# Patient Record
Sex: FEMALE | Race: OTHER | HISPANIC OR LATINO | URBAN - NONMETROPOLITAN AREA
[De-identification: names, ages, dates, MRNs, and addresses within clinical notes are randomized per-mention and may not be internally consistent; named-entity substitution may affect disease eponyms.]

---

## 2024-04-17 ENCOUNTER — OFFICE (OUTPATIENT)
Dept: URBAN - NONMETROPOLITAN AREA CLINIC 1 | Facility: CLINIC | Age: 34
End: 2024-04-17
Payer: COMMERCIAL

## 2024-04-17 VITALS
WEIGHT: 122 LBS | HEART RATE: 89 BPM | DIASTOLIC BLOOD PRESSURE: 88 MMHG | HEIGHT: 61 IN | SYSTOLIC BLOOD PRESSURE: 125 MMHG

## 2024-04-17 DIAGNOSIS — R13.10 DYSPHAGIA, UNSPECIFIED: ICD-10-CM

## 2024-04-17 DIAGNOSIS — K21.9 GASTRO-ESOPHAGEAL REFLUX DISEASE WITHOUT ESOPHAGITIS: ICD-10-CM

## 2024-04-17 DIAGNOSIS — K59.00 CONSTIPATION, UNSPECIFIED: ICD-10-CM

## 2024-04-17 PROCEDURE — 99204 OFFICE O/P NEW MOD 45 MIN: CPT | Performed by: NURSE PRACTITIONER

## 2024-04-17 NOTE — SERVICEHPINOTES
Patient with known significant medical history presents to the clinic today for constipation, abdominal bloating, and globus sensation.  Since December, she reports constipation and globus sensation in back of her throat.  She states when she eats she feels as if food gets stuck in the back of her throat.  She reports abdominal bloating, postprandially as well.  PCP prescribed Prilosec low dose that did not show much improvement.  She has stopped this now and changed her diet eliminating gluten and dairy which has helped significantly.  She also reports improvement with her constipation since the diet change and has 1 BM daily, will strain occasionally, but not usually.  She was taking Miralax for constipation but has switched to a fiber detox which has also helped.  She has migraines and took excessive Excedrin migraines but stopped when she experienced epigastric pain.  She has not taken this medication in weeks, and denies melena, hematochezia, n/v/d, abdominal pain, unintentional weight loss, change in BM, or fever.  She has a good appetite.  Denies family history of crc.  She denies cardiac stents, anticoagulation therapy, supplemental oxygenation therapy, or ckd.    Has never had EGD or colonoscopy before in the past.

## 2024-04-17 NOTE — SERVICENOTES
Risks of procedure explained to patient she wishes to proceed 
 EGD with possible dilation for signs symptoms as above to rule out pylori, PUD, stricture
Constipation-advised patient to trial over-the-counter stool softeners to help with constipation, high-fiber diet and increase water intake 
Avoid all NSAIDs

## 2024-12-09 ENCOUNTER — OFFICE (OUTPATIENT)
Dept: URBAN - NONMETROPOLITAN AREA CLINIC 1 | Facility: CLINIC | Age: 34
End: 2024-12-09

## 2024-12-09 VITALS
HEIGHT: 61 IN | SYSTOLIC BLOOD PRESSURE: 100 MMHG | DIASTOLIC BLOOD PRESSURE: 77 MMHG | WEIGHT: 119 LBS | HEART RATE: 86 BPM

## 2024-12-09 DIAGNOSIS — K21.9 GASTRO-ESOPHAGEAL REFLUX DISEASE WITHOUT ESOPHAGITIS: ICD-10-CM

## 2024-12-09 DIAGNOSIS — R19.4 CHANGE IN BOWEL HABIT: ICD-10-CM

## 2024-12-09 PROCEDURE — 99213 OFFICE O/P EST LOW 20 MIN: CPT | Performed by: NURSE PRACTITIONER

## 2024-12-09 PROCEDURE — 36415 COLL VENOUS BLD VENIPUNCTURE: CPT | Performed by: NURSE PRACTITIONER

## 2024-12-09 RX ORDER — PLECANATIDE 3 MG/1
3 TABLET ORAL
Qty: 90 | Refills: 3 | Status: ACTIVE
Start: 2024-12-09

## 2024-12-09 NOTE — SERVICENOTES
if workup above neg, will proceed with diagnostic colonoscopy
Continue Omeprazole 40mg po bid x 12 weeks as prescribed, then decrease to Omeprazole 40mg po qd for 4 weeks, then can stop or if s/s persist recommend lowest effective dose.
f/u in 3 months
Will [place] [facility] as above for constipation
Increase fiber and water intake
Avoid all NSAIDs

## 2024-12-09 NOTE — SERVICEHPINOTES
Patient presents to the clinic today for follow up of gerd and constipation.  She recently underwent EGD via Dr. Gunter that yielded Grade B esophagitis, mild erythema with erosion of stomach consistent with gastritis, pathology neg for H.pylori, intestinal metaplasia or dysplasia.  She was started on Omeprazole 40mg po bid and is still taking as prescribed.  She is able to eat and the dysphagia has improved as well.  Still has occasional early satiety postprandial noted worse after consumption of greasy foods.  The abdominal bloating comes and goes.  She has chronic constipation in which she will only have a BM 2 days weekly with the aid of Miralax or Colace prn.  She does not take these medications daily.  If she eats Papaya it helps her constipation.  Denies melena, bloody stools, n/v/d, or unintentional weight loss.  Denies rectal pain.  She reports the change in her BM started 11/23 and has had constipation issues since.  br 
br
EGD by DR. Gunter 11/24br
Mild erythematous mucosa with erosion, consistent w/ gastritis in the body of stomach, Grade B esophagitis in third of esophagus. brStart Omeprazole 40mg po BID x 12 weeks.
br Pathology-brMinor reactive gastropathy. Neg H.pylori, intestinal metaplasia or dysplasia.  br 
br
Per OV LRayborn, FNP-C 4/24brPatient with known significant medical history presents to the clinic today for constipation, abdominal bloating, and globus sensation. Since December, she reports constipation and globus sensation in back of her throat. She states when she eats she feels as if food gets stuck in the back of her throat. She reports abdominal bloating, postprandially as well. PCP prescribed Prilosec low dose that did not show much improvement. She has stopped this now and changed her diet eliminating gluten and dairy which has helped significantly. She also reports improvement with her constipation since the diet change and has 1 BM daily, will strain occasionally, but not usually. She was taking Miralax for constipation but has switched to a fiber detox which has also helped. She has migraines and took excessive Excedrin migraines but stopped when she experienced epigastric pain. She has not taken this medication in weeks, and denies melena, hematochezia, n/v/d, abdominal pain, unintentional weight loss, change in BM, or fever. She has a good appetite. Denies family history of crc. She denies cardiac stents, anticoagulation therapy, supplemental oxygenation therapy, or ckd. Has never had EGD or colonoscopy before in the past.

## 2025-04-17 ENCOUNTER — OFFICE (OUTPATIENT)
Dept: URBAN - METROPOLITAN AREA CLINIC 11 | Facility: CLINIC | Age: 35
End: 2025-04-17
Payer: COMMERCIAL

## 2025-04-17 DIAGNOSIS — K59.00 CONSTIPATION, UNSPECIFIED: ICD-10-CM

## 2025-04-17 DIAGNOSIS — R14.0 ABDOMINAL DISTENSION (GASEOUS): ICD-10-CM

## 2025-04-17 PROCEDURE — 91065 BREATH HYDROGEN/METHANE TEST: CPT | Performed by: NURSE PRACTITIONER
